# Patient Record
Sex: FEMALE | Race: WHITE | NOT HISPANIC OR LATINO | URBAN - METROPOLITAN AREA
[De-identification: names, ages, dates, MRNs, and addresses within clinical notes are randomized per-mention and may not be internally consistent; named-entity substitution may affect disease eponyms.]

---

## 2021-08-11 ENCOUNTER — OUTPATIENT (OUTPATIENT)
Dept: OUTPATIENT SERVICES | Facility: HOSPITAL | Age: 46
LOS: 1 days | Discharge: HOME | End: 2021-08-11

## 2021-08-11 DIAGNOSIS — Z02.9 ENCOUNTER FOR ADMINISTRATIVE EXAMINATIONS, UNSPECIFIED: ICD-10-CM

## 2021-08-25 ENCOUNTER — OUTPATIENT (OUTPATIENT)
Dept: OUTPATIENT SERVICES | Facility: HOSPITAL | Age: 46
LOS: 1 days | Discharge: HOME | End: 2021-08-25
Payer: COMMERCIAL

## 2021-08-25 ENCOUNTER — RESULT REVIEW (OUTPATIENT)
Age: 46
End: 2021-08-25

## 2021-08-25 PROCEDURE — 77065 DX MAMMO INCL CAD UNI: CPT | Mod: 26,RT

## 2021-08-25 PROCEDURE — 19083 BX BREAST 1ST LESION US IMAG: CPT | Mod: RT

## 2021-08-25 PROCEDURE — 88305 TISSUE EXAM BY PATHOLOGIST: CPT | Mod: 26

## 2021-08-26 LAB — SURGICAL PATHOLOGY STUDY: SIGNIFICANT CHANGE UP

## 2021-08-31 DIAGNOSIS — N63.10 UNSPECIFIED LUMP IN THE RIGHT BREAST, UNSPECIFIED QUADRANT: ICD-10-CM

## 2022-09-20 PROBLEM — Z00.00 ENCOUNTER FOR PREVENTIVE HEALTH EXAMINATION: Status: ACTIVE | Noted: 2022-09-20

## 2022-09-27 ENCOUNTER — APPOINTMENT (OUTPATIENT)
Dept: ORTHOPEDIC SURGERY | Facility: CLINIC | Age: 47
End: 2022-09-27

## 2022-09-27 PROCEDURE — 73562 X-RAY EXAM OF KNEE 3: CPT | Mod: 50

## 2022-09-27 PROCEDURE — 99204 OFFICE O/P NEW MOD 45 MIN: CPT

## 2022-09-27 NOTE — HISTORY OF PRESENT ILLNESS
[de-identified] : Patient here for evaluation bilateral knee pain.\par Denies instabilty\par Pain with ambulation and stairs\par \par NAD\par bilateral knee\par No skin breakdown\par Tricompartmental TTP\par Positive patella grind\par PF crepitus\par Negative lachman\par Negative varus/valgus instability\par ROM 0-110\par Pain with forced extension and flexion\par NVI\par Compartments soft and NT\par \par Xray reviewed and significant for mild bilateral knee arthritis\par \par Plan\par went over findings and xrays\par pt script\par tx options discussed\par zilretta ordered for right knee as this is worse\par fu pending auth

## 2022-11-22 ENCOUNTER — APPOINTMENT (OUTPATIENT)
Dept: ORTHOPEDIC SURGERY | Facility: CLINIC | Age: 47
End: 2022-11-22

## 2022-11-22 DIAGNOSIS — M25.561 PAIN IN RIGHT KNEE: ICD-10-CM

## 2022-11-22 PROCEDURE — 20610 DRAIN/INJ JOINT/BURSA W/O US: CPT

## 2022-11-22 PROCEDURE — 99213 OFFICE O/P EST LOW 20 MIN: CPT | Mod: 25

## 2022-11-27 PROBLEM — M25.561 RIGHT KNEE PAIN: Status: ACTIVE | Noted: 2022-09-27

## 2022-11-27 NOTE — HISTORY OF PRESENT ILLNESS
[de-identified] : Patient here for evaluation bilateral knee pain.\par Denies instabilty\par Pain with ambulation and stairs\par \par NAD\par bilateral knee\par No skin breakdown\par Tricompartmental TTP\par Positive patella grind\par PF crepitus\par Negative lachman\par Negative varus/valgus instability\par ROM 0-110\par Pain with forced extension and flexion\par NVI\par Compartments soft and NT\par \par Xray reviewed and significant for mild bilateral knee arthritis\par \par Plan\par went over findings and xrays\par will proceed with injection today\par \par Large Joint Injection was performed because of pain/rom. Anesthesia: ethyl chloride sprayed topically.\par 5cc zilretta\par Medication was injected in the right knee. Patient has tried OTC's including aspirin, Ibuprofen, Aleve etc or prescription NSAIDS, and/or exercises at home and/ or physical therapy without satisfactory response. After verbal consent using sterile preparation and technique. The risks, benefits, and alternatives to cortisone injection were explained in full to the patient. Risks outlined include but are not limited to infection, sepsis, bleeding, scarring, skin discoloration, temporary increase in pain, syncopal episode, failure to resolve symptoms, allergic reaction, symptom recurrence, and elevation of blood sugar in diabetics. Patient understood the risks. All questions were answered. After discussion of options, patient requested an injection. Oral informed consent was obtained and sterile prep was done of the injection site. Sterile technique was utilized for the procedure including the preparation of the solutions used for the injection. Patient tolerated the procedure well. Advised to ice the injection site this evening. Prep with alcohol locally to site. Sterile technique used. Diagnostic ultrasound was performed of the knee to confirm.\par

## 2023-04-24 ENCOUNTER — APPOINTMENT (OUTPATIENT)
Dept: PAIN MANAGEMENT | Facility: CLINIC | Age: 48
End: 2023-04-24
Payer: COMMERCIAL

## 2023-04-24 VITALS — BODY MASS INDEX: 30.21 KG/M2 | WEIGHT: 160 LBS | HEIGHT: 61 IN

## 2023-04-24 PROCEDURE — 99204 OFFICE O/P NEW MOD 45 MIN: CPT | Mod: 25

## 2023-04-24 PROCEDURE — 20610 DRAIN/INJ JOINT/BURSA W/O US: CPT | Mod: 50

## 2023-04-25 NOTE — PHYSICAL EXAM
[de-identified] : left knee\par \par Inspection/palpation\par Negative swelling, erythema, warmth\par + tenderness to palpation to medial / lateral joint line\par Negative crepitus\par \par ROM:\par Flexion 135 (normal 120-150)\par Extension 0 (normal 0-15)\par \par Tests: Negative anterior drawer test\par Negative Remy's test\par There is no varus or valgus instability\par Quad strength is 5/5, hamstring strength is 5/5\par \par tenderness positive BL +\par patella grind in both knees\par \par right knee\par Inspection/palpation\par Negative swelling, erythema, warmth\par + tenderness to palpation to medial / lateral joint line\par Negative crepitus\par \par ROM:\par Flexion 135 (normal 120-150)\par Extension 0 (normal 0-15)\par \par Tests: Negative anterior drawer test\par Negative Remy's test\par There is no varus or valgus instability\par Quad strength is 5/5, hamstring strength is 5/5\par \par tenderness positive BL +\par patella grind in both knees

## 2023-04-25 NOTE — DISCUSSION/SUMMARY
[de-identified] : A discussion regarding available pain management treatment options occurred with the patient.  These included interventional, rehabilitative, pharmacological, and alternative modalities. We will proceed with the following:\par \par Interventional treatment options:\par - Proceed with bilateral knee steroid injection in the office today, 4/24/23. \par The patient is having significant knee pain with minimal relief with conservative treatments so we decided to proceed with an intra-articular knee injection. The risks and benefits were discussed which included bruising, hematoma, worse pain, intravascular injection, steroid reaction. All questions were answered and concerns addressed. The patient understands that this is likely a temporary solution to their pain. The patient may have up to three intra-articular joint injections a year and needs to consider surgical options for longer term relief of pain should they not improve.\par \par follow up in 4 months \par \par RAHUL Tapia attest that this documentation has been prepared under the direction and in the presence of provider Dr. Wilmer High. \par The documentation recorded by the scribe in my presence, accurately reflects the service I personally performed, and the decisions made by me with my edits as appropriate. \par \par Wilmer High, DO\par

## 2023-04-25 NOTE — PROCEDURE
[Large Joint Injection] : Large joint injection [Bilateral] : bilaterally of the [Knee] : knee [Pain] : pain [Inflammation] : inflammation [X-ray evidence of Osteoarthritis on this or prior visit] : x-ray evidence of Osteoarthritis on this or prior visit [Alcohol] : alcohol [Sterile technique used] : sterile technique used [___ cc    0.25%] : Bupivacaine (Marcaine) ~Vcc of 0.25%  [___ cc    4mg] : Dexamethasone (Decadron) ~Vcc of 4 mg  [] : Patient tolerated procedure well [Call if redness, pain or fever occur] : call if redness, pain or fever occur [Apply ice for 15min out of every hour for the next 12-24 hours as tolerated] : apply ice for 15 minutes out of every hour for the next 12-24 hours as tolerated [Patient was advised to rest the joint(s) for ____ days] : patient was advised to rest the joint(s) for [unfilled] days [Previous OTC use and PT nontherapeutic] : patient has tried OTC's including aspirin, Ibuprofen, Aleve, etc or prescription NSAIDS, and/or exercises at home and/or physical therapy without satisfactory response [Patient had decreased mobility in the joint] : patient had decreased mobility in the joint [Risks, benefits, alternatives discussed / Verbal consent obtained] : the risks benefits, and alternatives have been discussed, and verbal consent was obtained

## 2023-04-25 NOTE — HISTORY OF PRESENT ILLNESS
[FreeTextEntry1] : HISTORY OF PRESENT ILLNESS: Mrs. Noble is a 47 year old female complaining of knee pain.\par \par The pain started after no event. The patient has had this pain for 2 years that got worse in the last two months. Patient describes the pain as moderate-severe. During the last month the pain has been nearly constant of the time with symptoms worsening in no typical pattern. Pain described as dull aching, throbbing.The patient reports sit to stand the pain is at its worse and along with driving. Pain is increased with sitting, walking, exercise, relaxation. Pain is not changed with lying down, standing, coughing sneezing or bowel movements. Bowel or bladder habits have not changed.\par \par To mange this pain this patient had a knee steroid injection in November.\par \par ACTIVITIES: Patient could walk less then a block before the pain starts. Patient can sit 1 hour before pain starts. The patient seldom lies down because of pain. Patient uses no assisted walking device at this time. Patient has difficulty performing household chores, doing yardwork or shopping, participating in recreational activities or exercise at this time. \par   \par Prior Pain Medications: Tylenol, Aspirin

## 2023-08-22 ENCOUNTER — APPOINTMENT (OUTPATIENT)
Dept: PAIN MANAGEMENT | Facility: CLINIC | Age: 48
End: 2023-08-22
Payer: COMMERCIAL

## 2023-08-22 VITALS — WEIGHT: 160 LBS | BODY MASS INDEX: 30.21 KG/M2 | HEIGHT: 61 IN

## 2023-08-22 PROCEDURE — 99213 OFFICE O/P EST LOW 20 MIN: CPT

## 2023-08-22 RX ORDER — DICLOFENAC SODIUM 3 G/100G
3 GEL TOPICAL TWICE DAILY
Qty: 1 | Refills: 0 | Status: ACTIVE | COMMUNITY
Start: 2023-08-22 | End: 1900-01-01

## 2023-08-24 NOTE — DISCUSSION/SUMMARY
[de-identified] : A discussion regarding available pain management treatment options occurred with the patient.  These included interventional, rehabilitative, pharmacological, and alternative modalities. We will proceed with the following:  Interventional treatment options: Patient will proceed w/ b/l knee steroid injections  Rehabilitative options: - participation in active HEP was discussed  Medication based treatment options: - initiate trial of diclofenac sodium 3% gel   RAHUL Retana attest that this documentation has been prepared under the direction and in the presence of provider Dr. Wilmer High.  The documentation recorded by the scribe in my presence, accurately reflects the service I personally performed, and the decisions made by me with my edits as appropriate.   Wilmer High, DO

## 2023-08-24 NOTE — HISTORY OF PRESENT ILLNESS
[FreeTextEntry1] : HISTORY OF PRESENT ILLNESS: Mrs. Noble is a 47 year old female complaining of knee pain.  The pain started after no event. The patient has had this pain for 2 years that got worse in the last two months. Patient describes the pain as moderate-severe. During the last month the pain has been nearly constant of the time with symptoms worsening in no typical pattern. Pain described as dull aching, throbbing.The patient reports sit to stand the pain is at its worse and along with driving. Pain is increased with sitting, walking, exercise, relaxation. Pain is not changed with lying down, standing, coughing sneezing or bowel movements. Bowel or bladder habits have not changed.  To mange this pain this patient had a knee steroid injection in November.  ACTIVITIES: Patient could walk less then a block before the pain starts. Patient can sit 1 hour before pain starts. The patient seldom lies down because of pain. Patient uses no assisted walking device at this time. Patient has difficulty performing household chores, doing yardwork or shopping, participating in recreational activities or exercise at this time.     Prior Pain Medications: Tylenol, Aspirin   8/22/23 49 y/o f is present today for bilateral knee pain. Patient responded well from the last steroid injection that was given on 4/24/23, She states that she still presents with bilateral knee pain mainly when she bends and goes up and down the stairs. Patient takes Tylenol as needed for pain. Today patient rates her pain a 7/10 on the pain scale and is interested in bilateral knee gel injections.   Previous b/l knee steroid injections lasted about 3+ months.

## 2023-08-24 NOTE — PHYSICAL EXAM
[de-identified] : left knee Inspection/palpation Negative swelling, erythema, warmth + tenderness to palpation to medial / lateral joint line Negative crepitus  ROM: Flexion 125 (normal 120-150) Extension 0 (normal 0-15)  Tests: Negative anterior drawer test Negative Remy's test There is no varus or valgus instability Quad strength is 5/5, hamstring strength is 5/5  tenderness positive BL + patella grind in both knees  right knee Inspection/palpation Negative swelling, erythema, warmth + tenderness to palpation to medial / lateral joint line Negative crepitus  ROM: Flexion 135 (normal 120-150) Extension 0 (normal 0-15)  Tests: Negative anterior drawer test Negative Remy's test There is no varus or valgus instability Quad strength is 5/5, hamstring strength is 5/5  tenderness positive BL + patella grind in both knees

## 2023-08-28 ENCOUNTER — APPOINTMENT (OUTPATIENT)
Dept: PAIN MANAGEMENT | Facility: CLINIC | Age: 48
End: 2023-08-28
Payer: COMMERCIAL

## 2023-08-28 DIAGNOSIS — M17.0 BILATERAL PRIMARY OSTEOARTHRITIS OF KNEE: ICD-10-CM

## 2023-08-28 PROCEDURE — 20610 DRAIN/INJ JOINT/BURSA W/O US: CPT | Mod: 50

## 2023-08-28 PROCEDURE — 99213 OFFICE O/P EST LOW 20 MIN: CPT | Mod: 25

## 2023-09-01 NOTE — HISTORY OF PRESENT ILLNESS
[FreeTextEntry1] : HISTORY OF PRESENT ILLNESS: Mrs. Noble is a 47 year old female complaining of knee pain. The pain started after no event. The patient has had this pain for 2 years that got worse in the last two months. Patient describes the pain as moderate-severe. During the last month the pain has been nearly constant of the time with symptoms worsening in no typical pattern. Pain described as dull aching, throbbing.The patient reports sit to stand the pain is at its worse and along with driving. Pain is increased with sitting, walking, exercise, relaxation. Pain is not changed with lying down, standing, coughing sneezing or bowel movements. Bowel or bladder habits have not changed. To mange this pain this patient had a knee steroid injection in November.  ACTIVITIES: Patient could walk less then a block before the pain starts. Patient can sit 1 hour before pain starts. The patient seldom lies down because of pain. Patient uses no assisted walking device at this time. Patient has difficulty performing household chores, doing yardwork or shopping, participating in recreational activities or exercise at this time.  Prior Pain Medications: Tylenol, Aspirin   8/28/23: Today this patient is here for her bilateral knee pain. Patient continues to take Tylenol for her pain. She rates her knee pain 7/10 on the pain scale today. Previous bilateral knee steroid injections in 4/24/23 lasted about 3+ months. Patient denies any bowel or bladder dysfunction, incontinence, or saddle anesthesia.

## 2023-09-01 NOTE — PHYSICAL EXAM
[de-identified] : left knee Inspection/palpation Negative swelling, erythema, warmth + tenderness to palpation to medial / lateral joint line Negative crepitus  ROM: Flexion 125 (normal 120-150) Extension 0 (normal 0-15)  Tests: Negative anterior drawer test Negative Remy's test There is no varus or valgus instability Quad strength is 5/5, hamstring strength is 5/5  tenderness positive BL + patella grind in both knees  right knee Inspection/palpation Negative swelling, erythema, warmth + tenderness to palpation to medial / lateral joint line Negative crepitus  ROM: Flexion 135 (normal 120-150) Extension 0 (normal 0-15)  Tests: Negative anterior drawer test Negative Remy's test There is no varus or valgus instability Quad strength is 5/5, hamstring strength is 5/5  tenderness positive BL + patella grind in both knees

## 2023-09-01 NOTE — DISCUSSION/SUMMARY
[de-identified] : A discussion regarding available pain management treatment options occurred with the patient.  These included interventional, rehabilitative, pharmacological, and alternative modalities. We will proceed with the following:  Interventional treatment options: Patient will proceed with a bilateral knee steroid injection in our office today   Rehabilitative options: - participation in active HEP was discussed  Medication based treatment options: -  diclofenac sodium 3% gel   follow up   I Yue Tapia, attest that this documentation has been prepared under the direction and in the presence of provider Dr. Wilmer High.  The documentation recorded by the scribe in my presence, accurately reflects the service I personally performed, and the decisions made by me with my edits as appropriate.   Wilmer High, DO

## 2024-06-20 ENCOUNTER — APPOINTMENT (OUTPATIENT)
Dept: ORTHOPEDIC SURGERY | Facility: CLINIC | Age: 49
End: 2024-06-20
Payer: COMMERCIAL

## 2024-06-20 VITALS — HEIGHT: 61 IN | BODY MASS INDEX: 30.21 KG/M2 | WEIGHT: 160 LBS

## 2024-06-20 DIAGNOSIS — M54.12 RADICULOPATHY, CERVICAL REGION: ICD-10-CM

## 2024-06-20 PROCEDURE — 72050 X-RAY EXAM NECK SPINE 4/5VWS: CPT

## 2024-06-20 PROCEDURE — 99214 OFFICE O/P EST MOD 30 MIN: CPT

## 2024-06-20 RX ORDER — MELOXICAM 15 MG/1
15 TABLET ORAL
Qty: 30 | Refills: 0 | Status: ACTIVE | COMMUNITY
Start: 2024-06-20 | End: 1900-01-01

## 2024-06-20 NOTE — DISCUSSION/SUMMARY
[de-identified] : Patient has numbness and tingling of the right upper extremity at this time I am suspicious that this is originating from her C-spine.  So at this time an MRI is warranted for further evaluation and to best dictate further treatment plans which may be a cortisone injection into the C-spine.  In the meantime I am prescribing meloxicam 15 mg for patient to take as needed for pain inflammation and she should continue applying heat to the neck.  Patient will modify tibias tolerated.  Can call 2 to 3 days after the MRI she will follow-up with pain management for further discussion of treatment after this.

## 2024-06-20 NOTE — PHYSICAL EXAM
[de-identified] : Physical exam right upper extremity: No erythema, ecchymosis, edema of C-spine or right upper extremity.  No tenderness palpation right upper extremity.  Patient is full range of motion of the right upper extremity and of the C-spine.  Sensation intact to light touch in all distributions of the right upper extremity.  Full strength of the right upper extremity.  Negative Spurling's test.  Negative Tinel's at the elbow and at the wrist.  Negative Phalen's at the wrist.  +2 distal radius pulse.

## 2024-06-20 NOTE — DATA REVIEWED
[FreeTextEntry1] : X-ray images were obtained at the office today.  AP, lateral, flexion, extension views of the C-spine reveal some mild degenerative changes of the lower C-spine level, no acute fractures or dislocations

## 2024-07-10 ENCOUNTER — APPOINTMENT (OUTPATIENT)
Dept: PAIN MANAGEMENT | Facility: CLINIC | Age: 49
End: 2024-07-10